# Patient Record
Sex: MALE | Race: WHITE | NOT HISPANIC OR LATINO | Employment: UNEMPLOYED | URBAN - NONMETROPOLITAN AREA
[De-identification: names, ages, dates, MRNs, and addresses within clinical notes are randomized per-mention and may not be internally consistent; named-entity substitution may affect disease eponyms.]

---

## 2017-05-05 ENCOUNTER — OFFICE VISIT (OUTPATIENT)
Dept: OTOLARYNGOLOGY | Facility: CLINIC | Age: 57
End: 2017-05-05

## 2017-05-05 VITALS — BODY MASS INDEX: 29.68 KG/M2 | WEIGHT: 212 LBS | HEIGHT: 71 IN | HEART RATE: 93 BPM

## 2017-05-05 DIAGNOSIS — R51.9 CHRONIC INTRACTABLE HEADACHE, UNSPECIFIED HEADACHE TYPE: ICD-10-CM

## 2017-05-05 DIAGNOSIS — G89.29 CHRONIC INTRACTABLE HEADACHE, UNSPECIFIED HEADACHE TYPE: ICD-10-CM

## 2017-05-05 DIAGNOSIS — J32.4 CHRONIC PANSINUSITIS: Primary | ICD-10-CM

## 2017-05-05 PROCEDURE — 99203 OFFICE O/P NEW LOW 30 MIN: CPT | Performed by: OTOLARYNGOLOGY

## 2017-05-05 PROCEDURE — 31231 NASAL ENDOSCOPY DX: CPT | Performed by: OTOLARYNGOLOGY

## 2017-05-05 RX ORDER — AMITRIPTYLINE HYDROCHLORIDE 75 MG/1
TABLET, FILM COATED ORAL NIGHTLY
COMMUNITY

## 2017-05-05 RX ORDER — ALBUTEROL SULFATE 2.5 MG/3ML
2.5 SOLUTION RESPIRATORY (INHALATION) EVERY 4 HOURS PRN
COMMUNITY

## 2017-05-05 RX ORDER — FLUOXETINE HYDROCHLORIDE 20 MG/1
20 CAPSULE ORAL DAILY
COMMUNITY

## 2017-05-05 RX ORDER — CLOPIDOGREL BISULFATE 75 MG/1
75 TABLET ORAL DAILY
COMMUNITY

## 2017-05-05 RX ORDER — GABAPENTIN 100 MG/1
100 CAPSULE ORAL 3 TIMES DAILY
COMMUNITY

## 2017-05-05 RX ORDER — OMEPRAZOLE 20 MG/1
20 CAPSULE, DELAYED RELEASE ORAL DAILY
COMMUNITY

## 2017-05-05 RX ORDER — FLUTICASONE PROPIONATE 50 MCG
2 SPRAY, SUSPENSION (ML) NASAL DAILY
COMMUNITY

## 2017-05-05 RX ORDER — ASPIRIN 81 MG/1
81 TABLET, CHEWABLE ORAL DAILY
COMMUNITY

## 2017-05-05 RX ORDER — ACETAZOLAMIDE 250 MG/1
250 TABLET ORAL 3 TIMES DAILY
COMMUNITY

## 2017-05-05 RX ORDER — INSULIN GLARGINE 100 [IU]/ML
INJECTION, SOLUTION SUBCUTANEOUS DAILY
COMMUNITY

## 2017-05-05 RX ORDER — MONTELUKAST SODIUM 10 MG/1
10 TABLET ORAL NIGHTLY
COMMUNITY

## 2017-09-01 ENCOUNTER — OFFICE VISIT (OUTPATIENT)
Dept: OTOLARYNGOLOGY | Facility: CLINIC | Age: 57
End: 2017-09-01

## 2017-09-01 VITALS — HEIGHT: 71 IN | WEIGHT: 220 LBS | BODY MASS INDEX: 30.8 KG/M2 | TEMPERATURE: 98 F

## 2017-09-01 DIAGNOSIS — R51.9 CHRONIC INTRACTABLE HEADACHE, UNSPECIFIED HEADACHE TYPE: ICD-10-CM

## 2017-09-01 DIAGNOSIS — J32.4 CHRONIC PANSINUSITIS: Primary | ICD-10-CM

## 2017-09-01 DIAGNOSIS — G89.29 CHRONIC INTRACTABLE HEADACHE, UNSPECIFIED HEADACHE TYPE: ICD-10-CM

## 2017-09-01 PROCEDURE — 31231 NASAL ENDOSCOPY DX: CPT | Performed by: OTOLARYNGOLOGY

## 2017-09-01 PROCEDURE — 99214 OFFICE O/P EST MOD 30 MIN: CPT | Performed by: OTOLARYNGOLOGY

## 2017-09-01 RX ORDER — CEFUROXIME AXETIL 500 MG/1
TABLET ORAL
Qty: 42 TABLET | Refills: 0 | Status: SHIPPED | OUTPATIENT
Start: 2017-09-01 | End: 2018-06-14

## 2017-09-01 RX ORDER — FLUTICASONE PROPIONATE 50 MCG
2 SPRAY, SUSPENSION (ML) NASAL DAILY
Qty: 16 G | Refills: 11 | Status: SHIPPED | OUTPATIENT
Start: 2017-09-01 | End: 2017-10-01

## 2017-09-04 NOTE — PROGRESS NOTES
Subjective   Kenneth Almanza is a 56 y.o. male.       History of Present Illness   Patient was seen previously with a primary complaint of daily unremitting headache.  Has a history of previous head injuries.  Had radiographic findings consistent with chronic pansinusitis however he really did not have significant symptoms to suggest clinical evidence of sinusitis.  Returns today because he's been to /[facility] to see his neurologist and they still don't know what's causing his headaches.  He is not having any rhinorrhea or postnasal drainage.      The following portions of the patient's history were reviewed and updated as appropriate: allergies, current medications, past family history, past medical history, past social history, past surgical history and problem list.     reports that he has quit smoking. He has never used smokeless tobacco.   Patient is not a tobacco user and has not been counseled for use of tobacco products      Review of Systems   Constitutional: Negative for fever.   Neurological: Positive for headaches.           Objective   Physical Exam  General: Well-developed well-nourished male in no acute distress.  Alert and oriented ×3. Head: Normocephalic. Face: Symmetrical strength and appearance. PERRL. EOMI. Voice:Strong. Speech:Fluent  Ears: External ears no deformity, canals no discharge, tympanic membranes intact clear and mobile bilaterally.  Nose: Nares show no discharge mass polyp or purulence.  Boggy mucosa is present.  No gross external deformity.  Septum: Slightly to the left  Oral cavity: Lips and gums without lesions.  Tongue and floor of mouth without lesions.  Parotid and submandibular ducts unobstructed.  No mucosal lesions on the buccal mucosa or vestibule of the mouth.  Pharynx: No erythema exudate mass or ulcer  Neck: No lymphadenopathy.  No thyromegaly.  Trachea and larynx midline.  No masses in the parotid or submandibular glands.      Nasal endoscopy is performed: Bridger-Synephrine  and Xylocaine are instilled the nares.  0° scope is passed into each nostril.  There is a modest septal deformity to the left however there is no evidence of mass, polyp, or purulence in either middle meatal cleft.    Assessment/Plan   Kenneth was seen today for headache.    Diagnoses and all orders for this visit:    Chronic pansinusitis    Chronic intractable headache, unspecified headache type      Plan: Given his radiographic findings I will treat him clinically for sinusitis with Flonase 2 sprays each nostril daily and Ceftin 500 mg by mouth twice a day ×3 weeks.  Reevaluate in a month.  His headaches do not change at all I think that in all likelihood this would mean that his sinuses are not the source of his headache.

## 2018-05-24 ENCOUNTER — OFFICE VISIT (OUTPATIENT)
Dept: OTOLARYNGOLOGY | Facility: CLINIC | Age: 58
End: 2018-05-24

## 2018-05-24 VITALS — HEIGHT: 71 IN | BODY MASS INDEX: 32.76 KG/M2 | WEIGHT: 234 LBS | OXYGEN SATURATION: 97 %

## 2018-05-24 DIAGNOSIS — J32.4 CHRONIC PANSINUSITIS: Primary | ICD-10-CM

## 2018-05-24 DIAGNOSIS — G89.29 CHRONIC INTRACTABLE HEADACHE, UNSPECIFIED HEADACHE TYPE: ICD-10-CM

## 2018-05-24 DIAGNOSIS — R51.9 CHRONIC INTRACTABLE HEADACHE, UNSPECIFIED HEADACHE TYPE: ICD-10-CM

## 2018-05-24 DIAGNOSIS — J32.0 CHRONIC MAXILLARY SINUSITIS: Primary | ICD-10-CM

## 2018-05-24 PROCEDURE — 99214 OFFICE O/P EST MOD 30 MIN: CPT | Performed by: OTOLARYNGOLOGY

## 2018-05-24 PROCEDURE — 31231 NASAL ENDOSCOPY DX: CPT | Performed by: OTOLARYNGOLOGY

## 2018-05-24 RX ORDER — NITROGLYCERIN 0.4 MG/1
0.4 TABLET SUBLINGUAL
COMMUNITY

## 2018-05-24 RX ORDER — CYCLOBENZAPRINE HCL 10 MG
10 TABLET ORAL 3 TIMES DAILY PRN
COMMUNITY

## 2018-05-24 RX ORDER — ROSUVASTATIN CALCIUM 40 MG/1
40 TABLET, COATED ORAL DAILY
COMMUNITY

## 2018-05-24 RX ORDER — TIZANIDINE 4 MG/1
4 TABLET ORAL NIGHTLY PRN
COMMUNITY

## 2018-05-24 RX ORDER — CEFUROXIME AXETIL 250 MG/1
250 TABLET ORAL 2 TIMES DAILY
Qty: 20 TABLET | Refills: 0 | Status: SHIPPED | OUTPATIENT
Start: 2018-05-24 | End: 2018-06-14

## 2018-05-24 NOTE — PROGRESS NOTES
Subjective   Kenneth Almanza is a 57 y.o. male.       History of Present Illness   Patient is known to me from previous evaluation of chronic sinusitis and headache.  Patient complains of daily unremitting headache that is been present for many months.  Has a history of previous head injury.  Does have radiographic findings consistent with chronic pansinusitis but doesn't have typical sinus symptoms including rhinorrhea and postnasal drainage.  He is chronically anticoagulated which makes surgery a less attractive option.  I last saw the patient on November 1, 2017 and prescribed a 3 week course of Ceftin and advised him to return in a month to see if this improved his symptoms at all, which might of suggested that indeed his sinuses were contributing to his headaches.  However he did not return for follow-up.  He returns today stating he has seen 3 different neurologist all of whom have reportedly told him that the problem is his sinuses.  His headaches continue to be daily and unremitting although they're less diffuse than they were previously and they are now mainly bitemporal and frontal.  Still doesn't have much rhinorrhea.      The following portions of the patient's history were reviewed and updated as appropriate: allergies, current medications, past family history, past medical history, past social history, past surgical history and problem list.     reports that he has quit smoking. He has never used smokeless tobacco.   Patient is not a tobacco user and has not been counseled for use of tobacco products      Review of Systems   Constitutional: Negative for fever.   Neurological: Positive for headaches.           Objective   Physical Exam  General: Well-developed well-nourished male in no acute distress.  Alert and oriented ×3. Head: Normocephalic. Face: Symmetrical strength and appearance. Voice:Strong. Speech:Fluent  Ears: External ears no deformity, canals no discharge, tympanic membranes show  tympanosclerosis on the right with no infection or effusion and a dry central perforation on the left  Nose: Nares show no discharge mass polyp or purulence.  Boggy mucosa is present.  No gross external deformity.  Septum: To the left inferiorly to the right superiorly  Oral cavity: Lips and gums without lesions.  Tongue and floor of mouth without lesions.  Parotid and submandibular ducts unobstructed.  No mucosal lesions on the buccal mucosa or vestibule of the mouth.  Pharynx: No erythema exudate mass or ulcer  Neck: No lymphadenopathy.  No thyromegaly.  Trachea and larynx midline.  No masses in the parotid or submandibular glands.    Nasal endoscopy is performed: Bridger-Synephrine and Xylocaine are instilled the nares.  0° scope is passed into each nostril.  The inferior, middle, and superior turbinates are examined as is the nasal septum and the nasopharynx.  Findings included boggy mucosa with septal deformity to the right superiorly but both middle meatal clefts do not show any evidence of mass, or polyp, or purulence.    Assessment/Plan   Kenneth was seen today for sinus problem.    Diagnoses and all orders for this visit:    Chronic pansinusitis    Chronic intractable headache, unspecified headache type        Plan: Once again like to try an empiric trial of medical therapy and evaluate response both clinically and radiographically.  We'll prescribe Ceftin 500 mg by mouth twice a day ×3 weeks.  He is already using Flonase and should continue this daily 2 sprays each nostril.  He is to return in 3 weeks with a CT scan right at the end of the course of antibiotics.  Depending on response to treatment further recommendations can be made.    Addendum: Ear exam corrected today 6/14/18.  This does not change the diagnosis, treatment plan, or level of service for this encounter

## 2018-06-14 ENCOUNTER — HOSPITAL ENCOUNTER (OUTPATIENT)
Dept: CT IMAGING | Facility: HOSPITAL | Age: 58
Discharge: HOME | End: 2018-06-14
Admitting: OTOLARYNGOLOGY

## 2018-06-14 ENCOUNTER — OFFICE VISIT (OUTPATIENT)
Dept: OTOLARYNGOLOGY | Facility: CLINIC | Age: 58
End: 2018-06-14

## 2018-06-14 VITALS — WEIGHT: 229.2 LBS | HEIGHT: 71 IN | OXYGEN SATURATION: 95 % | HEART RATE: 88 BPM | BODY MASS INDEX: 32.09 KG/M2

## 2018-06-14 DIAGNOSIS — J32.0 CHRONIC MAXILLARY SINUSITIS: ICD-10-CM

## 2018-06-14 DIAGNOSIS — H72.92 PERFORATION OF LEFT TYMPANIC MEMBRANE: ICD-10-CM

## 2018-06-14 DIAGNOSIS — J32.9 CHRONIC SINUSITIS, UNSPECIFIED LOCATION: ICD-10-CM

## 2018-06-14 DIAGNOSIS — R51.9 CHRONIC INTRACTABLE HEADACHE, UNSPECIFIED HEADACHE TYPE: Primary | ICD-10-CM

## 2018-06-14 DIAGNOSIS — G89.29 CHRONIC INTRACTABLE HEADACHE, UNSPECIFIED HEADACHE TYPE: Primary | ICD-10-CM

## 2018-06-14 PROCEDURE — 99214 OFFICE O/P EST MOD 30 MIN: CPT | Performed by: OTOLARYNGOLOGY

## 2018-06-14 PROCEDURE — 70486 CT MAXILLOFACIAL W/O DYE: CPT

## 2018-06-17 NOTE — PROGRESS NOTES
Subjective   Kenneth Almanza is a 57 y.o. male.       History of Present Illness   Patient has a history of previous head injuries and intractable headaches.  Has had extensive neurologic workup.  Had radiographic findings consistent with chronic pansinusitis on a previous CT scan but did not have typical symptoms of chronic bacterial sinusitis and so I felt that his headaches were likely related to his previous head injuries however apparently his neurologist's still believe his sinuses are at least part of the problem.  He was therefore given a 3 week course of Ceftin 500 mg twice a day approximately 3 weeks ago.  He has completed that and undergone a CT scan of the sinuses today.  This is reviewed and shows hypoplasia of the right frontal sinus mild mucosal thickening in the left frontal sinus trivial mucosal thickening in the maxillary sinuses bilaterally and the ethmoids are essentially clear.  This is significantly improved from previous.  He states today his headache is bifrontal and given the fact that he does not have significant aeration of the frontal sinus on the right I think this certainly makes it less likely that this is a sinus headache.  He also states he got injections in the back of his neck which have begun helping with his headaches.  He is still not having any purulent rhinorrhea.      The following portions of the patient's history were reviewed and updated as appropriate: allergies, current medications, past family history, past medical history, past social history, past surgical history and problem list.     reports that he has quit smoking. He has never used smokeless tobacco.   Patient is not a tobacco user and has not been counseled for use of tobacco products      Review of Systems   Constitutional: Negative for fever.   Neurological: Positive for headaches.           Objective   Physical Exam  General: Well-developed well-nourished male in no acute distress.  Alert and oriented ×3.  Head: Normocephalic. Face: Symmetrical strength and appearance. PERRL. EOMI. Voice:Strong. Speech:Fluent  Ears: External ears no deformity, canals no discharge, tympanic intact tympanosclerosis on the right.  Dry central perforation is present on the left with no evidence of squamous ingrowth or granulation tissue.  Nose: Nares show no discharge mass polyp or purulence.  Boggy mucosa is present.  No gross external deformity.  Septum: To the left inferiorly and to the right superiorly  Oral cavity: Lips and gums without lesions.  Tongue and floor of mouth without lesions.  Parotid and submandibular ducts unobstructed.  No mucosal lesions on the buccal mucosa or vestibule of the mouth.  Pharynx: No erythema exudate mass or ulcer  Neck: No lymphadenopathy.  No thyromegaly.  Trachea and larynx midline.  No masses in the parotid or submandibular glands.      Assessment/Plan   Kenneth was seen today for results.    Diagnoses and all orders for this visit:    Chronic intractable headache, unspecified headache type    Chronic sinusitis, unspecified location    Perforation of left tympanic membrane        Plan: Told the patient that between the significant improvement in radiographic findings, the lack of chronic sinonasal symptoms, as well as the fact that he is symptomatic in an area where he does not have significant sinus development, all suggestive me that the majority of his headache is not sinonasal and is likely either postconcussive or tension related.  I do not think sinus surgery would be of benefit and given his anticoagulated state would be significant risk for surgical morbidity.  I do think he should continue using his Flonase daily.  He should maintain water precautions to his left ear.  Return in 1 year but call sooner for problems.